# Patient Record
Sex: FEMALE | HISPANIC OR LATINO | URBAN - METROPOLITAN AREA
[De-identification: names, ages, dates, MRNs, and addresses within clinical notes are randomized per-mention and may not be internally consistent; named-entity substitution may affect disease eponyms.]

---

## 2022-03-02 ENCOUNTER — OFFICE VISIT (OUTPATIENT)
Dept: URBAN - METROPOLITAN AREA CLINIC 98 | Facility: CLINIC | Age: 87
End: 2022-03-02
Payer: MEDICARE

## 2022-03-02 VITALS
HEART RATE: 73 BPM | DIASTOLIC BLOOD PRESSURE: 75 MMHG | TEMPERATURE: 97.2 F | BODY MASS INDEX: 27.68 KG/M2 | HEIGHT: 63 IN | WEIGHT: 156.2 LBS | SYSTOLIC BLOOD PRESSURE: 115 MMHG

## 2022-03-02 DIAGNOSIS — Z85.038 PERSONAL HISTORY OF COLON CANCER: ICD-10-CM

## 2022-03-02 DIAGNOSIS — K94.01 BLEEDING FROM COLOSTOMY STOMA: ICD-10-CM

## 2022-03-02 PROCEDURE — 99243 OFF/OP CNSLTJ NEW/EST LOW 30: CPT | Performed by: INTERNAL MEDICINE

## 2022-03-02 PROCEDURE — 99203 OFFICE O/P NEW LOW 30 MIN: CPT | Performed by: INTERNAL MEDICINE

## 2022-03-02 RX ORDER — FUROSEMIDE 20 MG/1
1 TABLET TABLET ORAL ONCE A DAY
Status: ACTIVE | COMMUNITY

## 2022-03-02 RX ORDER — APIXABAN 2.5 MG/1
AS DIRECTED TABLET, FILM COATED ORAL
Status: ACTIVE | COMMUNITY

## 2022-03-02 RX ORDER — MONTELUKAST 10 MG/1
1 TABLET TABLET, FILM COATED ORAL ONCE A DAY
Status: ACTIVE | COMMUNITY

## 2022-03-02 RX ORDER — ROSUVASTATIN CALCIUM 5 MG/1
1 TABLET TABLET, FILM COATED ORAL ONCE A DAY
Status: ACTIVE | COMMUNITY

## 2022-03-02 NOTE — PHYSICAL EXAM GASTROINTESTINAL
Abdomen , soft, nontender, nondistended , no guarding or rigidity , no masses palpable , colostomy in LLQ, w brown stools, no blood in stools, w erythema of colonic mucosa, normal bowel sounds , Liver and Spleen , no hepatomegaly present , no hepatosplenomegaly , liver nontender , spleen not palpable

## 2022-03-02 NOTE — HPI-TODAY'S VISIT:
Patient referred by Brionna Christian MD for  evaluation  of colostomy bleeding.  Copy of this consult sent to Dr. Sears. 87 yo pt w Hx CC in 2008 , in [place], s/p L-hemicolectomy w  adjuvant CTX and sigmoid colostomy, w normal colonoscopy in 2015. NO follow up since. Today, she reports some bleeding from colostomy, wo melenic stools nor hematochezia. No abdominal pain and no constitutional sxs. 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 No ASA use. She reports small amount of blood from colostomy. Brown stools. Nocardiorespiratory sxs.  Denies anorexia or weight loss. No urologic sxs. No COVID-19 exposure and has received 2 doses of COVID-19 vaccine. No other complaints. No recent  labs.

## 2022-03-05 ENCOUNTER — DASHBOARD ENCOUNTERS (OUTPATIENT)
Age: 87
End: 2022-03-05

## 2022-03-05 PROBLEM — 429699009: Status: ACTIVE | Noted: 2022-03-05

## 2022-03-05 PROBLEM — 1085451000119107: Status: ACTIVE | Noted: 2022-03-05

## 2022-03-14 ENCOUNTER — TELEPHONE ENCOUNTER (OUTPATIENT)
Dept: URBAN - METROPOLITAN AREA CLINIC 23 | Facility: CLINIC | Age: 87
End: 2022-03-14

## 2024-10-01 ENCOUNTER — APPOINTMENT (RX ONLY)
Dept: URBAN - METROPOLITAN AREA CLINIC 45 | Facility: CLINIC | Age: 89
Setting detail: DERMATOLOGY
End: 2024-10-01

## 2024-10-01 DIAGNOSIS — L29.89 OTHER PRURITUS: ICD-10-CM | Status: INADEQUATELY CONTROLLED

## 2024-10-01 DIAGNOSIS — F42.4 EXCORIATION (SKIN-PICKING) DISORDER: ICD-10-CM

## 2024-10-01 PROCEDURE — ? COUNSELING

## 2024-10-01 PROCEDURE — ? PRESCRIPTION

## 2024-10-01 PROCEDURE — 99204 OFFICE O/P NEW MOD 45 MIN: CPT

## 2024-10-01 PROCEDURE — ? TREATMENT REGIMEN

## 2024-10-01 RX ORDER — TRIAMCINOLONE ACETONIDE 1 MG/G
CREAM TOPICAL BID
Qty: 80 | Refills: 2 | Status: ERX | COMMUNITY
Start: 2024-10-01

## 2024-10-01 RX ADMIN — TRIAMCINOLONE ACETONIDE: 1 CREAM TOPICAL at 00:00

## 2024-10-01 ASSESSMENT — LOCATION DETAILED DESCRIPTION DERM
LOCATION DETAILED: LEFT SUPERIOR MEDIAL UPPER BACK
LOCATION DETAILED: EPIGASTRIC SKIN
LOCATION DETAILED: EPIGASTRIC SKIN
LOCATION DETAILED: LEFT SUPERIOR MEDIAL UPPER BACK
LOCATION DETAILED: LEFT LATERAL SUPERIOR CHEST
LOCATION DETAILED: LEFT MEDIAL SUPERIOR CHEST

## 2024-10-01 ASSESSMENT — LOCATION SIMPLE DESCRIPTION DERM
LOCATION SIMPLE: LEFT UPPER BACK
LOCATION SIMPLE: LEFT UPPER BACK
LOCATION SIMPLE: ABDOMEN
LOCATION SIMPLE: ABDOMEN
LOCATION SIMPLE: CHEST
LOCATION SIMPLE: CHEST

## 2024-10-01 ASSESSMENT — LOCATION ZONE DERM
LOCATION ZONE: TRUNK
LOCATION ZONE: TRUNK

## 2025-07-10 ENCOUNTER — OFFICE VISIT (OUTPATIENT)
Dept: URBAN - METROPOLITAN AREA CLINIC 98 | Facility: CLINIC | Age: 89
End: 2025-07-10
Payer: MEDICARE

## 2025-07-10 DIAGNOSIS — K52.9 CHRONIC DIARRHEA: ICD-10-CM

## 2025-07-10 PROCEDURE — 99204 OFFICE O/P NEW MOD 45 MIN: CPT | Performed by: INTERNAL MEDICINE

## 2025-07-10 RX ORDER — FUROSEMIDE 20 MG/1
1 TABLET TABLET ORAL ONCE A DAY
Status: ON HOLD | COMMUNITY

## 2025-07-10 RX ORDER — METRONIDAZOLE 250 MG/1
1 TABLET TABLET ORAL TWICE A DAY
Qty: 28 TABLET | Refills: 0 | OUTPATIENT
Start: 2025-07-10 | End: 2025-07-24

## 2025-07-10 RX ORDER — ROSUVASTATIN CALCIUM 5 MG/1
1 TABLET TABLET, COATED ORAL ONCE A DAY
Status: ACTIVE | COMMUNITY

## 2025-07-10 RX ORDER — MONTELUKAST 10 MG/1
1 TABLET TABLET, FILM COATED ORAL ONCE A DAY
Status: ON HOLD | COMMUNITY

## 2025-07-10 RX ORDER — IRON FUM,PS CMP/VIT C/NIACIN 125-40-3MG
1 CAPSULE BETWEEN MEALS CAPSULE ORAL ONCE A DAY
Status: ACTIVE | COMMUNITY

## 2025-07-10 RX ORDER — APIXABAN 2.5 MG/1
AS DIRECTED TABLET, FILM COATED ORAL
Status: ACTIVE | COMMUNITY

## 2025-07-10 RX ORDER — HYDROCHLOROTHIAZIDE 12.5 MG/1
1 TABLET IN THE MORNING TABLET ORAL ONCE A DAY
Status: ACTIVE | COMMUNITY

## 2025-07-10 RX ORDER — ROSUVASTATIN 5 MG/1
1 TABLET TABLET, FILM COATED ORAL ONCE A DAY
Status: ON HOLD | COMMUNITY

## 2025-07-10 NOTE — HPI-TODAY'S VISIT:
Patient referred by Brionna Christian MD for  evaluation  of colostomy bleeding.  Copy of this consult sent to Dr. Sears. 85 yo pt w Hx CC in 2008 , in [place], s/p L-hemicolectomy w  adjuvant CTX and sigmoid colostomy, w normal colonoscopy in 2015. NO follow up since. Today, she reports some bleeding from colostomy, wo melenic stools nor hematochezia. No abdominal pain and no constitutional sxs. 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 No ASA use. She reports small amount of blood from colostomy. Brown stools. Nocardiorespiratory sxs.  Denies anorexia or weight loss. No urologic sxs. No COVID-19 exposure and has received 2 doses of COVID-19 vaccine. No other complaints. No recent  labs. . 7/10/25    Daughter translates Saw Dr Wallace in 2022 concerning the colostomy no bleeding now diarrhea persistent on Eliquis for a fib Hx colon cancer resection colostomy had MRI at [facility] from oncologist following liver mass has liver lesion  10 cm liver mass concern for hepatocellular malignancy.. Has been losing weight steadily per daughter

## 2025-07-14 ENCOUNTER — LAB OUTSIDE AN ENCOUNTER (OUTPATIENT)
Dept: URBAN - METROPOLITAN AREA CLINIC 98 | Facility: CLINIC | Age: 89
End: 2025-07-14

## 2025-07-21 ENCOUNTER — TELEPHONE ENCOUNTER (OUTPATIENT)
Dept: URBAN - METROPOLITAN AREA CLINIC 98 | Facility: CLINIC | Age: 89
End: 2025-07-21

## 2025-07-21 PROBLEM — 47367009: Status: ACTIVE | Noted: 2025-07-21

## 2025-07-21 LAB
ADENOVIRUS F 40/41: NOT DETECTED
C. DIFFICILE TOXIN A/B, STOOL - QDX: NEGATIVE
CAMPYLOBACTER: NOT DETECTED
CLOSTRIDIUM DIFFICILE: NOT DETECTED
ENTAMOEBA HISTOLYTICA: NOT DETECTED
ENTEROAGGREGATIVE E.COLI: NOT DETECTED
ENTEROTOXIGENIC E.COLI: NOT DETECTED
ESCHERICHIA COLI O157: NOT DETECTED
FECAL FAT, QUALITATIVE: (no result)
GIARDIA LAMBLIA: NOT DETECTED
H. PYLORI (EIA) - QDX: NEGATIVE
NOROVIRUS GI/GII: NOT DETECTED
OVA AND PARASITE - QDX: (no result)
PANCREATICELASTASE ELISA, STOOL: (no result)
ROTAVIRUS A: NOT DETECTED
SALMONELLA SPP.: NOT DETECTED
SHIGA-LIKE TOXIN PRODUCING E.COLI: NOT DETECTED
SHIGELLA SPP. / ENTEROINVASIVE E.COLI: NOT DETECTED
VIBRIO PARAHAEMOLYTICUS: NOT DETECTED
VIBRIO SPP.: NOT DETECTED
YERSINIA ENTEROCOLITICA: NOT DETECTED

## 2025-07-21 RX ORDER — PANCRELIPASE 36000; 180000; 114000 [USP'U]/1; [USP'U]/1; [USP'U]/1
2 WITH EACH MEAL CAPSULE, DELAYED RELEASE PELLETS ORAL
Qty: 100 | Refills: 5 | OUTPATIENT
Start: 2025-07-21

## 2025-07-23 ENCOUNTER — OFFICE VISIT (OUTPATIENT)
Dept: URBAN - METROPOLITAN AREA CLINIC 98 | Facility: CLINIC | Age: 89
End: 2025-07-23
Payer: MEDICARE

## 2025-07-23 DIAGNOSIS — R19.7 ACUTE DIARRHEA: ICD-10-CM

## 2025-07-23 PROCEDURE — 99213 OFFICE O/P EST LOW 20 MIN: CPT | Performed by: INTERNAL MEDICINE

## 2025-07-23 RX ORDER — PANCRELIPASE 36000; 180000; 114000 [USP'U]/1; [USP'U]/1; [USP'U]/1
2 WITH EACH MEAL CAPSULE, DELAYED RELEASE PELLETS ORAL
Qty: 100 | Refills: 5 | Status: ACTIVE | COMMUNITY
Start: 2025-07-21

## 2025-07-23 RX ORDER — METRONIDAZOLE 250 MG/1
1 TABLET TABLET ORAL TWICE A DAY
Qty: 28 TABLET | Refills: 0 | Status: ACTIVE | COMMUNITY
Start: 2025-07-10 | End: 2025-07-24

## 2025-07-23 RX ORDER — FUROSEMIDE 20 MG/1
1 TABLET TABLET ORAL ONCE A DAY
Status: ACTIVE | COMMUNITY

## 2025-07-23 RX ORDER — MONTELUKAST 10 MG/1
1 TABLET TABLET, FILM COATED ORAL ONCE A DAY
Status: ACTIVE | COMMUNITY

## 2025-07-23 RX ORDER — ROSUVASTATIN 5 MG/1
1 TABLET TABLET, FILM COATED ORAL ONCE A DAY
Status: ACTIVE | COMMUNITY

## 2025-07-23 RX ORDER — ROSUVASTATIN CALCIUM 5 MG/1
1 TABLET TABLET, COATED ORAL ONCE A DAY
Status: ACTIVE | COMMUNITY

## 2025-07-23 RX ORDER — IRON FUM,PS CMP/VIT C/NIACIN 125-40-3MG
1 CAPSULE BETWEEN MEALS CAPSULE ORAL ONCE A DAY
Status: ACTIVE | COMMUNITY

## 2025-07-23 RX ORDER — APIXABAN 2.5 MG/1
AS DIRECTED TABLET, FILM COATED ORAL
Status: ACTIVE | COMMUNITY

## 2025-07-23 RX ORDER — HYDROCHLOROTHIAZIDE 12.5 MG/1
1 TABLET IN THE MORNING TABLET ORAL ONCE A DAY
Status: ACTIVE | COMMUNITY

## 2025-07-23 NOTE — HPI-TODAY'S VISIT:
Patient referred by Brionna Christian MD for  evaluation  of colostomy bleeding.  Copy of this consult sent to Dr. Seasr. 85 yo pt w Hx CC in 2008 , in [place], s/p L-hemicolectomy w  adjuvant CTX and sigmoid colostomy, w normal colonoscopy in 2015. NO follow up since. Today, she reports some bleeding from colostomy, wo melenic stools nor hematochezia. No abdominal pain and no constitutional sxs. 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 No ASA use. She reports small amount of blood from colostomy. Brown stools. Nocardiorespiratory sxs.  Denies anorexia or weight loss. No urologic sxs. No COVID-19 exposure and has received 2 doses of COVID-19 vaccine. No other complaints. No recent  labs. . 7/10/25    Daughter translates Saw Dr Wallace in 2022 concerning the colostomy no bleeding now diarrhea persistent on Eliquis for a fib Hx colon cancer resection colostomy had MRI at [facility] from oncologist following liver mass has liver lesion  10 cm liver mass concern for hepatocellular malignancy.. Has been losing weight steadily per daughter Patient referred by Brionna Christian MD for  evaluation  of colostomy bleeding.  Copy of this consult sent to Dr. Sears. 85 yo pt w Hx CC in 2008 , in [place], s/p L-hemicolectomy w  adjuvant CTX and sigmoid colostomy, w normal colonoscopy in 2015. NO follow up since. Today, she reports some bleeding from colostomy, wo melenic stools nor hematochezia. No abdominal pain and no constitutional sxs. 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 No ASA use. She reports small amount of blood from colostomy. Brown stools. Nocardiorespiratory sxs.  Denies anorexia or weight loss. No urologic sxs. No COVID-19 exposure and has received 2 doses of COVID-19 vaccine. No other complaints. No recent  labs. . 7/10/25    Daughter translates Saw Dr Wallace in 2022 concerning the colostomy no bleeding now diarrhea persistent on Eliquis for a fib Hx colon cancer resection colostomy had MRI at [facility] from oncologist following liver mass has liver lesion  10 cm liver mass concern for hepatocellular malignancy.. Has been losing weight steadily per daughter. . 7/23/35 has seen oncology concerning the liver mass has persistent diarrhea per colostomy causing irritation of stoma